# Patient Record
Sex: MALE | Race: BLACK OR AFRICAN AMERICAN | URBAN - METROPOLITAN AREA
[De-identification: names, ages, dates, MRNs, and addresses within clinical notes are randomized per-mention and may not be internally consistent; named-entity substitution may affect disease eponyms.]

---

## 2020-01-07 ENCOUNTER — OFFICE VISIT (OUTPATIENT)
Dept: URBAN - METROPOLITAN AREA CLINIC 23 | Facility: CLINIC | Age: 70
End: 2020-01-07
Payer: COMMERCIAL

## 2020-01-07 DIAGNOSIS — H43.811 VITREOUS DEGENERATION, RIGHT EYE: ICD-10-CM

## 2020-01-07 PROCEDURE — 92134 CPTRZ OPH DX IMG PST SGM RTA: CPT | Performed by: OPTOMETRIST

## 2020-01-07 PROCEDURE — 92014 COMPRE OPH EXAM EST PT 1/>: CPT | Performed by: OPTOMETRIST

## 2020-01-07 RX ORDER — TIMOLOL MALEATE 5 MG/ML
0.5 % SOLUTION/ DROPS OPHTHALMIC
Qty: 0 | Refills: 0 | Status: INACTIVE
Start: 2020-01-07 | End: 2020-09-29

## 2020-01-07 ASSESSMENT — INTRAOCULAR PRESSURE
OD: 14
OS: 12

## 2020-01-07 ASSESSMENT — KERATOMETRY
OD: 45.13
OS: 44.75

## 2020-01-07 NOTE — IMPRESSION/PLAN
Impression: Type 2 diabetes mellitus w/o complication: Q24.4. Plan: Discussed diagnosis in detail with patient. Advised and emphasized patient of blood sugar control. Discussed risks of progression. Poor compliance can lead to blindness. OCT macula performed and reviewed with patient today- no signs of diabetic retinopathy. Reassured patient of condition and treatment. Will continue to observe condition and or symptoms.

## 2020-01-07 NOTE — IMPRESSION/PLAN
Impression: Vitreous degeneration, right eye: H43.811 OD. Plan: Discussed diagnosis in detail with patient. Posterior vitreous detachment accounts for the patient's complaints. All signs and risks of retinal detachment and tears were discussed. Patient instructed to call the office immediately if symptoms worsen. Patient requested consult with Retinal Specialist for possible vitrectomy OD. Poor Patient cooperation. Patient states reason for vision loss is due to laser procedure done at outside clinic.

## 2020-01-07 NOTE — IMPRESSION/PLAN
Impression: Primary open-angle glaucoma, bilateral, moderate stage: Q70.0227. Plan: Discussed diagnosis in detail with patient. Discussed treatment options with patient. Reassured patient of current condition and treatment. Will continue to monitor IOP. Continue using all glaucoma medications as directed.

## 2020-02-05 ENCOUNTER — OFFICE VISIT (OUTPATIENT)
Dept: URBAN - METROPOLITAN AREA CLINIC 23 | Facility: CLINIC | Age: 70
End: 2020-02-05
Payer: COMMERCIAL

## 2020-02-05 DIAGNOSIS — H43.311 VITREOUS MEMBRANES AND STRANDS, RIGHT EYE: ICD-10-CM

## 2020-02-05 DIAGNOSIS — E11.9 TYPE 2 DIABETES MELLITUS W/O COMPLICATION: ICD-10-CM

## 2020-02-05 DIAGNOSIS — H43.391 OTHER VITREOUS OPACITIES, RIGHT EYE: Primary | ICD-10-CM

## 2020-02-05 DIAGNOSIS — H35.3131 NEXDTVE AGE-RELATED MCLR DEGN, BILATERAL, EARLY DRY STAGE: ICD-10-CM

## 2020-02-05 PROCEDURE — 92134 CPTRZ OPH DX IMG PST SGM RTA: CPT | Performed by: OPHTHALMOLOGY

## 2020-02-05 PROCEDURE — 92004 COMPRE OPH EXAM NEW PT 1/>: CPT | Performed by: OPHTHALMOLOGY

## 2020-02-05 RX ORDER — OFLOXACIN 3 MG/ML
0.3 % SOLUTION/ DROPS OPHTHALMIC
Qty: 5 | Refills: 3 | Status: INACTIVE
Start: 2020-02-05 | End: 2020-10-22

## 2020-02-05 RX ORDER — PREDNISOLONE ACETATE 10 MG/ML
1 % SUSPENSION/ DROPS OPHTHALMIC
Qty: 10 | Refills: 5 | Status: INACTIVE
Start: 2020-02-05 | End: 2020-10-22

## 2020-02-05 ASSESSMENT — KERATOMETRY
OS: 45.00
OD: 45.25

## 2020-02-05 ASSESSMENT — INTRAOCULAR PRESSURE
OD: 16
OS: 16

## 2020-02-05 NOTE — IMPRESSION/PLAN
Impression: Other vitreous opacities, right eye: H43.391. OD. Condition: unstable. Vision: vision affected. Plan: Discussed diagnosis in detail with patient. No treatment is required at this time. Patient states floaters are very bothersome, vision is affected and interferes with daily activities. Surgical treatment is recommended based on patient's complaints PPVx. Surgical risks and benefits were discussed, explained and understood by patient. All questions answered. Patient elects to proceed with recommendation. RL1. Educational material provided to patient. 
Erxed drops to pharmacy on file

## 2020-02-05 NOTE — IMPRESSION/PLAN
Impression: Nexdtve age-related mclr degn, bilateral, early dry stage: H35.3131. OU. Condition: stable. Vision: vision affected. Plan: Discussed diagnosis in detail with patient. No treatment is required at this time. Use of vitamins has shown to improve the effects of ARMD. Recommend AREDS 2 formula. Wear quality sunglasses and monitor vision at home with [facility]. Call the office for an immediate appointment if [address] worsens. Educational material provided to patient. OCT performed today: no IRF or SRF OU - stable.

## 2020-02-05 NOTE — IMPRESSION/PLAN
Impression: Type 2 diabetes mellitus w/o complication: S57.8. OU. Condition: stable. Vision: vision not affected. diagnosed with DM 1998 last A1C 6.5 12/2019 Plan: Discussed diagnosis in detail with patient. Exam shows minimal Diabetic changes. No treatment is recommended at this time. Emphasized blood sugar control and advised to keep future appointments with PCP and/or Endocrinologist for the management of Diabetes. Recommend observation for now.  OCT shows stable no active edema

## 2020-08-25 ENCOUNTER — OFFICE VISIT (OUTPATIENT)
Dept: URBAN - METROPOLITAN AREA CLINIC 23 | Facility: CLINIC | Age: 70
End: 2020-08-25
Payer: COMMERCIAL

## 2020-08-25 PROCEDURE — 99213 OFFICE O/P EST LOW 20 MIN: CPT | Performed by: OPHTHALMOLOGY

## 2020-08-25 PROCEDURE — 92134 CPTRZ OPH DX IMG PST SGM RTA: CPT | Performed by: OPHTHALMOLOGY

## 2020-08-25 ASSESSMENT — INTRAOCULAR PRESSURE
OD: 15
OS: 16

## 2020-08-25 NOTE — IMPRESSION/PLAN
Impression: Other vitreous opacities, right eye: H43.391. OD. Condition: new prob, no addtl w/u needed. Plan: Discussed diagnosis in detail with patient. Patient states he was scheduled for surgery a few months ago for removal of floaters at the beginning of the [address], but at this time he is not interested in surgery. Patient states he has noticed a decrease in his vision OU. Exam shows Optic Nerve cupping OU and Optos confirms findings. Recommend a Glaucoma consult ASAP. Will reassess the retina in 2 mos. OCT shows no significant ERM or CME and Optos shows Optic Nerve cupping OU. Patient states he will not return to see his Glaucoma specialist and is currently using Latanoprost OU QHS, Timolol OU BID and Brimonidine OU BID.  Recommend to continue using Glaucoma meds until he sees the Glaucoma specialist

## 2020-09-29 ENCOUNTER — OFFICE VISIT (OUTPATIENT)
Dept: URBAN - METROPOLITAN AREA CLINIC 29 | Facility: CLINIC | Age: 70
End: 2020-09-29
Payer: COMMERCIAL

## 2020-09-29 DIAGNOSIS — Z96.1 PRESENCE OF INTRAOCULAR LENS: ICD-10-CM

## 2020-09-29 PROCEDURE — 92083 EXTENDED VISUAL FIELD XM: CPT | Performed by: OPHTHALMOLOGY

## 2020-09-29 PROCEDURE — 92014 COMPRE OPH EXAM EST PT 1/>: CPT | Performed by: OPHTHALMOLOGY

## 2020-09-29 PROCEDURE — 92133 CPTRZD OPH DX IMG PST SGM ON: CPT | Performed by: OPHTHALMOLOGY

## 2020-09-29 PROCEDURE — 76514 ECHO EXAM OF EYE THICKNESS: CPT | Performed by: OPHTHALMOLOGY

## 2020-09-29 PROCEDURE — 92020 GONIOSCOPY: CPT | Performed by: OPHTHALMOLOGY

## 2020-09-29 RX ORDER — TIMOLOL MALEATE 5 MG/ML
0.5 % SOLUTION/ DROPS OPHTHALMIC
Qty: 3 | Refills: 3 | Status: INACTIVE
Start: 2020-09-29 | End: 2020-10-20

## 2020-09-29 RX ORDER — LATANOPROST 50 UG/ML
0.005 % SOLUTION OPHTHALMIC
Qty: 3 | Refills: 3 | Status: INACTIVE
Start: 2020-09-29 | End: 2021-02-16

## 2020-09-29 RX ORDER — BRIMONIDINE TARTRATE 2 MG/ML
0.2 % SOLUTION/ DROPS OPHTHALMIC
Qty: 3 | Refills: 3 | Status: INACTIVE
Start: 2020-09-29 | End: 2021-01-19

## 2020-09-29 ASSESSMENT — INTRAOCULAR PRESSURE
OS: 21
OD: 22

## 2020-09-29 NOTE — IMPRESSION/PLAN
Impression: Presence of intraocular lens: Z96.1. Plan: 3 Piece IOL OU,  OD with Vitreous present from 9-1.  Will need vitrectomy before any surgical intervention

## 2020-09-29 NOTE — IMPRESSION/PLAN
Impression: Primary open-angle glaucoma, bilateral, moderate stage: A85.8502. *** vitrectomy before any surgical intervention  **
(+)Sulfa Allergy  Plan: Pt has Glaucoma    Gonio :SS Trace PG   Pachs:      Today's IOP :22/21   Tmax set today Target IOP low to mid teens Pt denies Fhx of Glaucoma Vision equal OU Last vf OU non pattern peripheral loss 9/29/20 (will need 24-2 C/D: 
OCT: 63/59 9/29/20 Pt denies Lung /Heart dx Pt is currently using : brimonidine q12h OU Plan :
1. Continue Brimonidine q12h OU Re-Start Latanoprost QHS OU Timolol QAM OU
2. *** vitrectomy before any surgical intervention  **

## 2020-10-20 ENCOUNTER — OFFICE VISIT (OUTPATIENT)
Dept: URBAN - METROPOLITAN AREA CLINIC 29 | Facility: CLINIC | Age: 70
End: 2020-10-20
Payer: COMMERCIAL

## 2020-10-20 DIAGNOSIS — H40.1132 PRIMARY OPEN-ANGLE GLAUCOMA, BILATERAL, MODERATE STAGE: Primary | ICD-10-CM

## 2020-10-20 PROCEDURE — 92014 COMPRE OPH EXAM EST PT 1/>: CPT | Performed by: OPHTHALMOLOGY

## 2020-10-20 RX ORDER — DORZOLAMIDE HYDROCHLORIDE AND TIMOLOL MALEATE 20; 5 MG/ML; MG/ML
SOLUTION/ DROPS OPHTHALMIC
Qty: 1 | Refills: 3 | Status: INACTIVE
Start: 2020-10-20 | End: 2020-10-22

## 2020-10-20 ASSESSMENT — INTRAOCULAR PRESSURE
OD: 19
OS: 17

## 2020-10-20 NOTE — IMPRESSION/PLAN
Impression: Primary open-angle glaucoma, bilateral, moderate stage: R94.6242. *** vitrectomy before any surgical intervention  **
(+)Sulfa Allergy Plan: Pt has Glaucoma    Gonio :SS Trace PG   Pachs:      Today's IOP : 21/16  Tmax : 22/21 Target IOP low to mid teens Pt denies Fhx of Glaucoma Vision equal OU Last vf OU non pattern peripheral loss 9/29/20 (will need 24-2 C/D: 0.8x0.8/0.9x0.85 OCT: 63/59 9/29/20 Pt denies Lung /Heart dx Pt is currently using : brimonidine q12h OU, Latanoprost QHS OU, Timolol QAM OU Plan :
1. Continue Brimonidine q12h OU Latanoprost QHS OU Change Timolol to Cosopt BID OU
2. IOP not much improvement with restarting drops. Recommend med adjustment. If no improvement discussed possible need for surgical intervention.   
3. *** vitrectomy before any surgical intervention  **

## 2020-11-11 ENCOUNTER — OFFICE VISIT (OUTPATIENT)
Dept: URBAN - METROPOLITAN AREA CLINIC 23 | Facility: CLINIC | Age: 70
End: 2020-11-11
Payer: COMMERCIAL

## 2020-11-11 PROCEDURE — 92014 COMPRE OPH EXAM EST PT 1/>: CPT | Performed by: OPHTHALMOLOGY

## 2020-11-11 PROCEDURE — 92250 FUNDUS PHOTOGRAPHY W/I&R: CPT | Performed by: OPHTHALMOLOGY

## 2020-11-11 PROCEDURE — 92134 CPTRZ OPH DX IMG PST SGM RTA: CPT | Performed by: OPHTHALMOLOGY

## 2020-11-11 RX ORDER — OFLOXACIN 3 MG/ML
0.3 % SOLUTION/ DROPS OPHTHALMIC
Qty: 5 | Refills: 3 | Status: INACTIVE
Start: 2020-11-11 | End: 2020-12-15

## 2020-11-11 RX ORDER — DUREZOL 0.5 MG/ML
0.05 % EMULSION OPHTHALMIC
Qty: 5 | Refills: 5 | Status: INACTIVE
Start: 2020-11-11 | End: 2020-11-17

## 2020-11-11 ASSESSMENT — INTRAOCULAR PRESSURE
OD: 15
OS: 15

## 2020-11-11 NOTE — IMPRESSION/PLAN
Impression: Other vitreous opacities, right eye: H43.391. OD. Condition: stable. Vision: vision affected. Plan: Discussed diagnosis in detail with patient. Exam OD confirms floaters. Patient states his vision is blurry OU. Explained to patient that surgery may or may not help with vision improvement due to Hx of Glaucoma and some of his visual complaints are also associated with Glaucoma. Patient would like to proceed with surgery OD for the possibility of visual improvement. Surgical treatment is recommended based on patient's complaints PPVx RIGHT EYE. Surgical risks and benefits were discussed, explained and understood by patient. All questions answered. RL1. Educational material provided to patient. Erx Durezol and Ofloxacin to patient's pharmacy. OCT  is stable OU and Optos shows Optic Nerve cupping OU. Will consider surgery for removal of floaters OS in the future. Continue using Glaucoma meds as recommended by Dr. Daxa Veelz.

## 2020-11-24 ENCOUNTER — SURGERY (OUTPATIENT)
Dept: URBAN - METROPOLITAN AREA SURGERY 11 | Facility: SURGERY | Age: 70
End: 2020-11-24
Payer: COMMERCIAL

## 2020-11-24 PROCEDURE — 67041 VIT FOR MACULAR PUCKER: CPT | Performed by: OPHTHALMOLOGY

## 2020-11-25 ENCOUNTER — POST-OPERATIVE VISIT (OUTPATIENT)
Dept: URBAN - METROPOLITAN AREA CLINIC 22 | Facility: CLINIC | Age: 70
End: 2020-11-25
Payer: COMMERCIAL

## 2020-11-25 PROCEDURE — 99024 POSTOP FOLLOW-UP VISIT: CPT | Performed by: OPTOMETRIST

## 2020-11-25 ASSESSMENT — INTRAOCULAR PRESSURE
OS: 21
OD: 21

## 2020-11-25 NOTE — IMPRESSION/PLAN
Impression: S/P PPVX 27GA ERMX OD - 1 Day. Encounter for surgical aftercare following surgery on a sense organ  Z48.810.  Plan: pt is to restart all glaucoma medication and is to start the post op drops given by Dr. Ashtyn Barba. he has a follow up scheduled 12/02 --Continue all meds

## 2020-12-02 ENCOUNTER — POST-OPERATIVE VISIT (OUTPATIENT)
Dept: URBAN - METROPOLITAN AREA CLINIC 22 | Facility: CLINIC | Age: 70
End: 2020-12-02
Payer: COMMERCIAL

## 2020-12-02 DIAGNOSIS — Z48.810 ENCOUNTER FOR SURGICAL AFTERCARE FOLLOWING SURGERY ON A SENSE ORGAN: Primary | ICD-10-CM

## 2020-12-02 PROCEDURE — 99024 POSTOP FOLLOW-UP VISIT: CPT | Performed by: OPTOMETRIST

## 2020-12-02 ASSESSMENT — INTRAOCULAR PRESSURE
OD: 29
OS: 17
OS: 16

## 2020-12-02 NOTE — IMPRESSION/PLAN
Impression: S/P PPVX 27GA ERMX OD - 8 Days. Encounter for surgical aftercare following surgery on a sense organ  Z48.810.  Plan: pt is to maintain follow up with Dr. Rejeana Litten --Continue glaucoma meds and stop the pred and the ofloxacin

## 2020-12-15 ENCOUNTER — OFFICE VISIT (OUTPATIENT)
Dept: URBAN - METROPOLITAN AREA CLINIC 29 | Facility: CLINIC | Age: 70
End: 2020-12-15
Payer: COMMERCIAL

## 2020-12-15 PROCEDURE — 92014 COMPRE OPH EXAM EST PT 1/>: CPT | Performed by: OPHTHALMOLOGY

## 2020-12-15 RX ORDER — DORZOLAMIDE HYDROCHLORIDE AND TIMOLOL MALEATE 20; 5 MG/ML; MG/ML
SOLUTION/ DROPS OPHTHALMIC
Qty: 15 | Refills: 1 | Status: INACTIVE
Start: 2020-12-15 | End: 2021-02-16

## 2020-12-15 ASSESSMENT — INTRAOCULAR PRESSURE
OS: 16
OD: 21

## 2020-12-15 NOTE — IMPRESSION/PLAN
Impression: Primary open-angle glaucoma, bilateral, moderate stage: R02.7283.
(+)Sulfa Allergy (+)S/P PPVIT w/Kenalog OD Antoinette 11/24/2020 Plan: Pt has Glaucoma    Gonio :SS Trace PG   Pachs:      Today's IOP :21/16   Tmax : 29/21 Target IOP low to mid teens Pt denies Fhx of Glaucoma Vision equal OU Last vf OU non pattern peripheral loss 9/29/20 (will need 24-2 C/D: 0.8x0.8/0.9x0.85 OCT: 63/59 9/29/20 Pt denies Lung /Heart dx Pt is currently using : brimonidine q12h OU, Latanoprost QHS OU, Cosopt BID  OU Plan :
1. Continue Brimonidine q12h OU Latanoprost QHS OU Cosopt BID OU (dorzolomide timolol) 2. IOP not much improvement with restarting drops. Recommend med adjustment. If no improvement discussed possible need for surgical intervention.   
3.Return 2 months  IOP and  24/2 VF